# Patient Record
Sex: MALE
[De-identification: names, ages, dates, MRNs, and addresses within clinical notes are randomized per-mention and may not be internally consistent; named-entity substitution may affect disease eponyms.]

---

## 2022-10-03 PROBLEM — Z00.00 ENCOUNTER FOR PREVENTIVE HEALTH EXAMINATION: Status: ACTIVE | Noted: 2022-10-03

## 2022-10-05 ENCOUNTER — NON-APPOINTMENT (OUTPATIENT)
Age: 47
End: 2022-10-05

## 2022-10-05 DIAGNOSIS — Z83.3 FAMILY HISTORY OF DIABETES MELLITUS: ICD-10-CM

## 2022-10-05 DIAGNOSIS — Z83.438 FAMILY HISTORY OF OTHER DISORDER OF LIPOPROTEIN METABOLISM AND OTHER LIPIDEMIA: ICD-10-CM

## 2022-10-05 DIAGNOSIS — Z82.49 FAMILY HISTORY OF ISCHEMIC HEART DISEASE AND OTHER DISEASES OF THE CIRCULATORY SYSTEM: ICD-10-CM

## 2022-10-05 DIAGNOSIS — Z78.9 OTHER SPECIFIED HEALTH STATUS: ICD-10-CM

## 2022-10-20 ENCOUNTER — APPOINTMENT (OUTPATIENT)
Dept: ENDOCRINOLOGY | Facility: CLINIC | Age: 47
End: 2022-10-20

## 2022-10-20 VITALS
DIASTOLIC BLOOD PRESSURE: 80 MMHG | SYSTOLIC BLOOD PRESSURE: 130 MMHG | OXYGEN SATURATION: 98 % | HEIGHT: 68 IN | TEMPERATURE: 97.8 F | BODY MASS INDEX: 25.08 KG/M2 | HEART RATE: 98 BPM | WEIGHT: 165.5 LBS

## 2022-10-20 PROCEDURE — 99213 OFFICE O/P EST LOW 20 MIN: CPT

## 2022-10-20 NOTE — PHYSICAL EXAM
[Alert] : alert [No Acute Distress] : no acute distress [Well Nourished] : well nourished [Well Developed] : well developed [Normal Sclera/Conjunctiva] : normal sclera/conjunctiva [EOMI] : extra ocular movement intact [No Proptosis] : no proptosis [Normal Oropharynx] : the oropharynx was normal [Thyroid Not Enlarged] : the thyroid was not enlarged [No Thyroid Nodules] : no palpable thyroid nodules [No Respiratory Distress] : no respiratory distress [No Accessory Muscle Use] : no accessory muscle use [Clear to Auscultation] : lungs were clear to auscultation bilaterally [Normal S1, S2] : normal S1 and S2 [Normal Rate] : heart rate was normal [Regular Rhythm] : with a regular rhythm [No Edema] : no peripheral edema [Pedal Pulses Normal] : the pedal pulses are present [Normal Bowel Sounds] : normal bowel sounds [Not Tender] : non-tender [Not Distended] : not distended [Soft] : abdomen soft [Normal Anterior Cervical Nodes] : no anterior cervical lymphadenopathy [Normal Posterior Cervical Nodes] : no posterior cervical lymphadenopathy [No Spinal Tenderness] : no spinal tenderness [Spine Straight] : spine straight [No Stigmata of Cushings Syndrome] : no stigmata of Cushings Syndrome [Normal Gait] : normal gait [Normal Strength/Tone] : muscle strength and tone were normal [No Rash] : no rash [Normal Reflexes] : deep tendon reflexes were 2+ and symmetric [No Tremors] : no tremors [Oriented x3] : oriented to person, place, and time [Normal] : normal [Full ROM] : with full range of motion [Normal Appearance] : normal in appearance [Acanthosis Nigricans] : no acanthosis nigricans [Diminished Throughout Both Feet] : normal tactile sensation with monofilament testing throughout both feet [de-identified] : Furred [FreeTextEntry1] : Deferred

## 2022-10-20 NOTE — ADDENDUM
[FreeTextEntry1] : This note was written by Mari Castro, acting as the  for Dr. Wolff. This note accurately reflects the work and decisions made by Dr. Wolff.

## 2022-10-20 NOTE — ASSESSMENT
[Diabetes Foot Care] : diabetes foot care [Importance of Diet and Exercise] : importance of diet and exercise to improve glycemic control, achieve weight loss and improve cardiovascular health [FreeTextEntry1] : ASSESSMENT NOTES Young  male has a past medical history of type 2 diabetes hyperlipidemia and hypertension is currently stable on his medications he is quite well compliant with his diet and his sugar level has been under control physically he is active.  Has not noticed any symptoms of hypoglycemia.\par \par LABS SUMMARY patient's lab results are pending which were drawn recently\par - \par \par PLAN\par - follow up in 4 months after the blood test has been completed\par - repeat blood tests in 4 months\par - continue the current medications.\par Hypoglycemia and foot care was discussed with the patient.\par Patient is also scheduled to undergo annual eye examination.

## 2022-10-20 NOTE — HISTORY OF PRESENT ILLNESS
[FreeTextEntry1] : This is a 47-year-old  male who with a past medical history of type 2 diabetes hyperlipidemia and hypertension presents for routine follow-up.  According to the patient his sugar levels have been have been fairly stable.  He is compliant with his medications and his diet and exercise regimen.  His fingersticks at home in the morning are averaging around 90 mg per DL and after the main meal between 125 to 135 mg per DL.  He denies any symptoms of polyuria polydipsia or nocturia patient has been stable and he has no numbness of his extremities.  Patient's review of systems is otherwise negative.

## 2022-10-20 NOTE — REVIEW OF SYSTEMS
[Negative] : Heme/Lymph [Decreased Appetite] : appetite not decreased [Chest Pain] : no chest pain [Palpitations] : no palpitations [Shortness Of Breath] : no shortness of breath [SOB on Exertion] : no shortness of breath on exertion [Nausea] : no nausea [Abdominal Pain] : no abdominal pain [Vomiting] : no vomiting [Confusion] : no confusion [Difficulty Walking] : no difficulty walking [Pain/Numbness of Digits] : no pain/numbness of digits [Poor Balance] : steady state balance

## 2023-03-21 ENCOUNTER — APPOINTMENT (OUTPATIENT)
Dept: ENDOCRINOLOGY | Facility: CLINIC | Age: 48
End: 2023-03-21
Payer: SELF-PAY

## 2023-03-21 VITALS
OXYGEN SATURATION: 97 % | WEIGHT: 164 LBS | SYSTOLIC BLOOD PRESSURE: 130 MMHG | HEART RATE: 92 BPM | BODY MASS INDEX: 24.86 KG/M2 | DIASTOLIC BLOOD PRESSURE: 70 MMHG | HEIGHT: 68 IN

## 2023-03-21 PROCEDURE — 99213 OFFICE O/P EST LOW 20 MIN: CPT

## 2023-03-21 NOTE — ASSESSMENT
[Diabetes Foot Care] : diabetes foot care [Long Term Vascular Complications] : long term vascular complications of diabetes [Carbohydrate Consistent Diet] : carbohydrate consistent diet [Importance of Diet and Exercise] : importance of diet and exercise to improve glycemic control, achieve weight loss and improve cardiovascular health [Hypoglycemia Management] : hypoglycemia management [Self Monitoring of Blood Glucose] : self monitoring of blood glucose [Retinopathy Screening] : Patient was referred to ophthalmology for retinopathy screening [FreeTextEntry1] : Young  male who has a history of type 2 diabetes hyperlipidemia hypertension appears to be well compliant with his diet and lifestyle changes.  His sugar level however is slightly elevated especially after meals.  He denies any use of alcohol.  His last blood test performed February 11, 2023 showed a hemoglobin A1c down to 7.7% and his complete metabolic panel is normal.  He has no symptoms of any hypoglycemia and there is no evidence of any vascular complications.  Recommendation\par 1.  I have advised the patient to continue his current therapy. \par 2.  The importance of diet exercise and lifestyle modifications were discussed with the patient\par 3.  Patient will have a repeat blood analysis in 3 months after which she will return for follow-up evaluation.  If the hemoglobin A1c is still above 7% then we may consider starting him on a GLP-1 analog.  The plan discussed in detail with the patient thank you

## 2023-03-21 NOTE — HISTORY OF PRESENT ILLNESS
[FreeTextEntry1] : This is a 47-year-old  male with past medical history of type 2 diabetes hyperlipidemia and hypertension who now presents for routine follow-up and evaluation.  According to the patient his sugar levels have been pretty stable.  In the morning they are ranging between 100 to 120 mg per DL and after meals they do not exceed 180 mg per DL.  He claimed that he is compliant with his diet and exercise and takes his medicines as instructed.  He denies any symptoms of nocturia polyuria polydipsia visual changes or numbness of extremities.  He has not experienced any chest pains shortness of breath dysuria nausea vomiting.  Patient has no history of any surgeries.  His current medications include pioglitazone 30 mg daily, bisoprolol/hydrochlorothiazide 5/6.25 mg tablet daily, Farxiga 10 mg daily, glimepiride 4 mg tablets 1 tablet twice a day, lisinopril 20 mg daily, metformin 850 mg tablets 3 times a day, simvastatin 10 mg daily and Norvasc 5 mg daily.

## 2023-11-13 ENCOUNTER — RX RENEWAL (OUTPATIENT)
Age: 48
End: 2023-11-13

## 2023-12-08 LAB — HBA1C MFR BLD HPLC: 7.7

## 2024-01-29 ENCOUNTER — APPOINTMENT (OUTPATIENT)
Dept: ENDOCRINOLOGY | Facility: CLINIC | Age: 49
End: 2024-01-29
Payer: SELF-PAY

## 2024-01-29 VITALS
WEIGHT: 165 LBS | OXYGEN SATURATION: 99 % | SYSTOLIC BLOOD PRESSURE: 136 MMHG | HEART RATE: 94 BPM | BODY MASS INDEX: 25.01 KG/M2 | HEIGHT: 68 IN | DIASTOLIC BLOOD PRESSURE: 86 MMHG | TEMPERATURE: 96.8 F

## 2024-01-29 PROCEDURE — 99213 OFFICE O/P EST LOW 20 MIN: CPT

## 2024-01-29 NOTE — HISTORY OF PRESENT ILLNESS
[FreeTextEntry1] : I this is a pleasant 48-year-old  male who has a past medical history of type 2 diabetes, hyperlipidemia and hypertension presents for routine follow-up.  Patient is currently taking multiple medications but it appears that he has not been compliant with his diet and exercise regimen.  His fingersticks in the morning are between 90 to 110 mg per DL and after the meals 130 to 150 mg per DL.  He has mild symptoms of polyuria and polydipsia.  He denies any numbness of extremities or visual changes or any dysuria.  Has not noticed any chest pain shortness of breath nausea vomiting.  Physically he is not very active and does not exercise at the present time.  He is current diabetic medications include Actos 30 mg daily, amlodipine 5 mg daily, because of prolonged with hydrochlorothiazide 5/6.25 mg daily, Farxiga 10 mg daily, glimepiride 4 mg twice a day, lisinopril 20 mg daily metformin 850 mg 3 times a day and simvastatin 10 mg daily.  Review of systems is otherwise negative.

## 2024-01-29 NOTE — PHYSICAL EXAM
[Alert] : alert [Well Nourished] : well nourished [No Acute Distress] : no acute distress [Well Developed] : well developed [Normal Sclera/Conjunctiva] : normal sclera/conjunctiva [EOMI] : extra ocular movement intact [No Proptosis] : no proptosis [Normal Outer Ear/Nose] : the ears and nose were normal in appearance [Normal Hearing] : hearing was normal [Normal Oropharynx] : the oropharynx was normal [No Neck Mass] : no neck mass was observed [Thyroid Not Enlarged] : the thyroid was not enlarged [No Thyroid Nodules] : no palpable thyroid nodules [No Respiratory Distress] : no respiratory distress [No Accessory Muscle Use] : no accessory muscle use [Clear to Auscultation] : lungs were clear to auscultation bilaterally [Normal S1, S2] : normal S1 and S2 [Normal Rate] : heart rate was normal [Regular Rhythm] : with a regular rhythm [Carotids Normal] : carotid pulses were normal with no bruits [No Edema] : no peripheral edema [Pedal Pulses Normal] : the pedal pulses are present [Normal Bowel Sounds] : normal bowel sounds [Not Tender] : non-tender [Not Distended] : not distended [Soft] : abdomen soft [No HSM] : no hepato-splenomegaly [Normal Supraclavicular Nodes] : no supraclavicular lymphadenopathy [Normal Anterior Cervical Nodes] : no anterior cervical lymphadenopathy [Normal Posterior Cervical Nodes] : no posterior cervical lymphadenopathy [No CVA Tenderness] : no ~M costovertebral angle tenderness [No Spinal Tenderness] : no spinal tenderness [Spine Straight] : spine straight [No Stigmata of Cushings Syndrome] : no stigmata of Cushings Syndrome [Normal Gait] : normal gait [No Clubbing, Cyanosis] : no clubbing  or cyanosis of the fingernails [Normal Strength/Tone] : muscle strength and tone were normal [No Rash] : no rash [No Skin Lesions] : no skin lesions [No Motor Deficits] : the motor exam was normal [No Sensory Deficits] : the sensory exam was normal to light touch and pinprick [Normal Reflexes] : deep tendon reflexes were 2+ and symmetric [No Tremors] : no tremors [Oriented x3] : oriented to person, place, and time

## 2024-01-29 NOTE — ASSESSMENT
[Diabetes Foot Care] : diabetes foot care [Long Term Vascular Complications] : long term vascular complications of diabetes [Carbohydrate Consistent Diet] : carbohydrate consistent diet [Importance of Diet and Exercise] : importance of diet and exercise to improve glycemic control, achieve weight loss and improve cardiovascular health [Exercise/Effect on Glucose] : exercise/effect on glucose [Hypoglycemia Management] : hypoglycemia management [Self Monitoring of Blood Glucose] : self monitoring of blood glucose [Retinopathy Screening] : Patient was referred to ophthalmology for retinopathy screening [FreeTextEntry1] : Young  male who has a history of a type 2 diabetes currently on oral medications.  Patient recently had a blood test on January 21 which revealed that his hemoglobin A1c is elevated at 8.5%, TSH is normal 1.660 complete metabolic panel and urine result is within normal range.  Suspect that the patient diabetes is uncontrolled due to his poor weight and also lack of exercise otherwise he is compliant with his medications.  There is no evidence of any vascular complications at the present time.  Recommendation 1.  I had a lengthy discussion with the patient explained to him in detail regarding the management of his diabetes.  Patient remains at a high risk for vascular complications unless he controls his diet and exercise regimen.  Patient was offered treatment with insulin but he is currently against it and would like to hold off for another 3 more months since he is going to change his lifestyle. 2.  I have advised the patient to continue his current oral diabetic medications. 3.  Patient is aware of the  signs and symptoms of hypoglycemia and its management. 4.  Patient will repeat the blood test in 3 months time and after this he will follow-up for reevaluation.  Thank you

## 2024-04-01 ENCOUNTER — RX RENEWAL (OUTPATIENT)
Age: 49
End: 2024-04-01

## 2024-04-22 ENCOUNTER — RX RENEWAL (OUTPATIENT)
Age: 49
End: 2024-04-22

## 2024-05-01 ENCOUNTER — APPOINTMENT (OUTPATIENT)
Dept: ENDOCRINOLOGY | Facility: CLINIC | Age: 49
End: 2024-05-01

## 2024-05-13 ENCOUNTER — APPOINTMENT (OUTPATIENT)
Dept: ENDOCRINOLOGY | Facility: CLINIC | Age: 49
End: 2024-05-13
Payer: SELF-PAY

## 2024-05-13 VITALS
BODY MASS INDEX: 25.01 KG/M2 | HEIGHT: 68 IN | OXYGEN SATURATION: 97 % | HEART RATE: 73 BPM | SYSTOLIC BLOOD PRESSURE: 132 MMHG | DIASTOLIC BLOOD PRESSURE: 86 MMHG | WEIGHT: 165 LBS

## 2024-05-13 DIAGNOSIS — E78.5 HYPERLIPIDEMIA, UNSPECIFIED: ICD-10-CM

## 2024-05-13 DIAGNOSIS — E11.9 TYPE 2 DIABETES MELLITUS W/OUT COMPLICATIONS: ICD-10-CM

## 2024-05-13 DIAGNOSIS — I10 ESSENTIAL (PRIMARY) HYPERTENSION: ICD-10-CM

## 2024-05-13 PROCEDURE — 99213 OFFICE O/P EST LOW 20 MIN: CPT

## 2024-05-13 RX ORDER — METFORMIN HYDROCHLORIDE 850 MG/1
850 TABLET, COATED ORAL 3 TIMES DAILY
Qty: 270 | Refills: 0 | Status: ACTIVE | COMMUNITY
Start: 2023-03-21

## 2024-05-13 RX ORDER — GLIMEPIRIDE 4 MG/1
4 TABLET ORAL TWICE DAILY
Qty: 180 | Refills: 1 | Status: ACTIVE | COMMUNITY

## 2024-05-13 RX ORDER — PIOGLITAZONE 30 MG/1
30 TABLET ORAL DAILY
Refills: 0 | Status: ACTIVE | COMMUNITY

## 2024-05-13 RX ORDER — BISOPROLOL FUMARATE AND HYDROCHLOROTHIAZIDE 5; 6.25 MG/1; MG/1
5-6.25 TABLET, FILM COATED ORAL DAILY
Qty: 90 | Refills: 0 | Status: COMPLETED | COMMUNITY
Start: 2023-03-21 | End: 2024-05-13

## 2024-05-13 RX ORDER — METFORMIN HYDROCHLORIDE 850 MG/1
850 TABLET, COATED ORAL 3 TIMES DAILY
Refills: 0 | Status: COMPLETED | COMMUNITY
End: 2024-05-13

## 2024-05-13 RX ORDER — DAPAGLIFLOZIN 10 MG/1
10 TABLET, FILM COATED ORAL DAILY
Refills: 0 | Status: ACTIVE | COMMUNITY

## 2024-05-13 RX ORDER — BISOPROLOL FUMARATE AND HYDROCHLOROTHIAZIDE 5; 6.25 MG/1; MG/1
5-6.25 TABLET, FILM COATED ORAL DAILY
Qty: 90 | Refills: 0 | Status: ACTIVE | COMMUNITY

## 2024-05-13 RX ORDER — PIOGLITAZONE HYDROCHLORIDE 30 MG/1
30 TABLET ORAL DAILY
Qty: 90 | Refills: 2 | Status: COMPLETED | COMMUNITY
Start: 2023-03-21 | End: 2024-05-13

## 2024-05-13 RX ORDER — LISINOPRIL 20 MG/1
20 TABLET ORAL DAILY
Qty: 90 | Refills: 2 | Status: ACTIVE | COMMUNITY

## 2024-05-13 RX ORDER — AMLODIPINE BESYLATE 5 MG/1
5 TABLET ORAL DAILY
Qty: 90 | Refills: 2 | Status: COMPLETED | COMMUNITY
Start: 2023-03-21 | End: 2024-05-13

## 2024-05-13 RX ORDER — AMLODIPINE BESYLATE 5 MG/1
5 TABLET ORAL
Qty: 90 | Refills: 2 | Status: ACTIVE | COMMUNITY

## 2024-05-13 NOTE — ASSESSMENT
[Diabetes Foot Care] : diabetes foot care [Long Term Vascular Complications] : long term vascular complications of diabetes [Carbohydrate Consistent Diet] : carbohydrate consistent diet [Importance of Diet and Exercise] : importance of diet and exercise to improve glycemic control, achieve weight loss and improve cardiovascular health [Exercise/Effect on Glucose] : exercise/effect on glucose [Hypoglycemia Management] : hypoglycemia management [Self Monitoring of Blood Glucose] : self monitoring of blood glucose [Retinopathy Screening] : Patient was referred to ophthalmology for retinopathy screening [FreeTextEntry1] : Young  male with a past medical history of type 2 diabetes, hypertension and hyperlipidemia presents for routine follow-up.  Patient recently had a blood test performed on 27 April which showed that the complete metabolic panel is normal, fasting glucose is 117 mg per DL, hemoglobin A1c is 8.0%,.  Patient still has uncontrolled type 2 diabetes even though he claimed that he is compliant with his diet and medications and physical exercise recommendation 1.  I have advised the patient to continue the current medications 2.  The importance of diet exercise was discussed with the patient 3.  Will obtain a repeat blood test in 3 months and if the hemoglobin A1c is still high we will have to initiate insulin therapy.  Plan was discussed in detail with the patient thank you

## 2024-05-13 NOTE — HISTORY OF PRESENT ILLNESS
[FreeTextEntry1] : This is a young 48-year-old  male who has a past medical history of type II, hypertension and hyperlipidemia.  Presents for routine follow-up.  Patient currently denies any significant symptoms.  He is Compliant with his medications and diet.  Patient is currently taking amlodipine 5 mg tablets, Lisinopril 20 mill gram tablet, Simvastatin 10 mg tablets daily, Bisoprolol plus hydrochlorothiazide 5.625 mg/25 mg daily, Farxiga 10 mg daily, glimepiride 4 mg tablet twice a day, metformin 850 mg 3 times daily and Actos 30 mg daily.  Patient denies any headache blurry vision, chest pain, shortness of breath, polyuria or polydipsia.  His vision has been stable and he denies any numbness or tingling of the lower extremities.  Patient claimed that he is walking at least 1 mile every day.

## 2024-06-20 ENCOUNTER — RX RENEWAL (OUTPATIENT)
Age: 49
End: 2024-06-20

## 2024-06-20 RX ORDER — SIMVASTATIN 10 MG/1
10 TABLET, FILM COATED ORAL
Qty: 90 | Refills: 0 | Status: ACTIVE | COMMUNITY
Start: 2024-06-20 | End: 1900-01-01

## 2024-07-23 ENCOUNTER — OFFICE (OUTPATIENT)
Dept: URBAN - METROPOLITAN AREA CLINIC 8 | Facility: CLINIC | Age: 49
Setting detail: OPHTHALMOLOGY
End: 2024-07-23

## 2024-07-23 DIAGNOSIS — H25.13: ICD-10-CM

## 2024-07-23 PROBLEM — E11.9 DIABETES TYPE 2 NO RETINOPATHY: Status: ACTIVE | Noted: 2024-07-23

## 2024-07-23 PROCEDURE — 99204 OFFICE O/P NEW MOD 45 MIN: CPT

## 2024-07-23 ASSESSMENT — CONFRONTATIONAL VISUAL FIELD TEST (CVF)
OS_FINDINGS: FULL
OD_FINDINGS: FULL

## 2024-08-30 ENCOUNTER — RX RENEWAL (OUTPATIENT)
Age: 49
End: 2024-08-30

## 2024-11-13 ENCOUNTER — RX RENEWAL (OUTPATIENT)
Age: 49
End: 2024-11-13

## 2024-12-03 ENCOUNTER — APPOINTMENT (OUTPATIENT)
Dept: ENDOCRINOLOGY | Facility: CLINIC | Age: 49
End: 2024-12-03
Payer: SELF-PAY

## 2024-12-03 VITALS
HEART RATE: 96 BPM | HEIGHT: 68 IN | WEIGHT: 163 LBS | OXYGEN SATURATION: 98 % | DIASTOLIC BLOOD PRESSURE: 74 MMHG | SYSTOLIC BLOOD PRESSURE: 132 MMHG | BODY MASS INDEX: 24.71 KG/M2

## 2024-12-03 DIAGNOSIS — E11.9 TYPE 2 DIABETES MELLITUS W/OUT COMPLICATIONS: ICD-10-CM

## 2024-12-03 DIAGNOSIS — I10 ESSENTIAL (PRIMARY) HYPERTENSION: ICD-10-CM

## 2024-12-03 DIAGNOSIS — E78.5 HYPERLIPIDEMIA, UNSPECIFIED: ICD-10-CM

## 2024-12-03 LAB — HBA1C MFR BLD HPLC: 8.1

## 2024-12-03 PROCEDURE — 99213 OFFICE O/P EST LOW 20 MIN: CPT

## 2024-12-03 RX ORDER — ACARBOSE 25 MG/1
25 TABLET ORAL 3 TIMES DAILY
Qty: 90 | Refills: 5 | Status: ACTIVE | COMMUNITY
Start: 2024-12-03 | End: 1900-01-01

## 2024-12-03 NOTE — HISTORY OF PRESENT ILLNESS
[FreeTextEntry1] : This is a pleasant 49-year-old  male with a past medical history of type 2 diabetes, hypertension and hyperlipidemia presents for routine follow-up.  Patient denies any significant complaints.  He reports that his sugar levels in the morning are between 90 to 95 mg per DL and after he eats his main meal they will shoot up to 1 4145 mg per DL.  Patient is compliant with his diet and also he is taking his diabetic medications which include metformin 850 mg 3 times a day, pioglitazone 30 mg daily, Farxiga 10 mg daily, and glimepiride 4 mg tablets twice a day.  He denies any symptoms of polyuria polydipsia or nocturia.  He has not had any episodes of dysuria, nausea vomiting abdominal pain or vision changes.  Physically the patient is very active and is working very hard.  He has no numbness or tingling of his lower extremities or any hypoglycemia.  His other medications include amlodipine 5 mg daily, bisoprolol with hydrochlorothiazide 5/6.25 mg daily, lisinopril 20 mg daily, simvastatin 10 mg daily .  Review of systems otherwise negative.

## 2024-12-03 NOTE — ASSESSMENT
[Diabetes Foot Care] : diabetes foot care [Long Term Vascular Complications] : long term vascular complications of diabetes [Carbohydrate Consistent Diet] : carbohydrate consistent diet [Importance of Diet and Exercise] : importance of diet and exercise to improve glycemic control, achieve weight loss and improve cardiovascular health [Exercise/Effect on Glucose] : exercise/effect on glucose [Hypoglycemia Management] : hypoglycemia management [Self Monitoring of Blood Glucose] : self monitoring of blood glucose [Retinopathy Screening] : Patient was referred to ophthalmology for retinopathy screening [FreeTextEntry1] : Young  male who has a past medical history of type 2 diabetes, hypertension hyperlipidemia presents for routine follow-up.  Patient's most recent lab test showed that his hemoglobin A1c is 8.1%, total cholesterol of 135, LDL is 71 mg per DL and his complete metabolic panel is within normal limits.  Patient claimed that he is compliant with his diet but I suspect that most likely he is having spikes after the meals.  Recommendation 1.  Patient was offered treatment with insulin therapy but the patient is very resistant to this and would like to wait for another 3 to 4 months before he will take any insulin.  Patient claimed that he will try his best to lower his blood glucose levels after he eats and will be more compliant with his diet and exercise. 2.  I advised the patient to start therapy with acarbose 25 mg tablets 3 times a day with meals.  Benefits and side effects of the medications were explained to the patient. 3.  Patient will continue with all his current medications 4. The patient will follow-up in approximately 3 to 4 months after obtaining a repeat blood test.  The plan was discussed in detail with the patient thank you

## 2024-12-03 NOTE — PHYSICAL EXAM
[Alert] : alert [Well Nourished] : well nourished [No Acute Distress] : no acute distress [Well Developed] : well developed [Normal Sclera/Conjunctiva] : normal sclera/conjunctiva [EOMI] : extra ocular movement intact [No Proptosis] : no proptosis [Normal Oropharynx] : the oropharynx was normal [Thyroid Not Enlarged] : the thyroid was not enlarged [No Thyroid Nodules] : no palpable thyroid nodules [No Respiratory Distress] : no respiratory distress [No Accessory Muscle Use] : no accessory muscle use [Clear to Auscultation] : lungs were clear to auscultation bilaterally [Normal S1, S2] : normal S1 and S2 [Normal Rate] : heart rate was normal [Regular Rhythm] : with a regular rhythm [No Edema] : no peripheral edema [Pedal Pulses Normal] : the pedal pulses are present [Normal Bowel Sounds] : normal bowel sounds [Not Tender] : non-tender [Not Distended] : not distended [Soft] : abdomen soft [Normal Supraclavicular Nodes] : no supraclavicular lymphadenopathy [Normal Anterior Cervical Nodes] : no anterior cervical lymphadenopathy [Normal Posterior Cervical Nodes] : no posterior cervical lymphadenopathy [No Spinal Tenderness] : no spinal tenderness [Spine Straight] : spine straight [No Stigmata of Cushings Syndrome] : no stigmata of Cushings Syndrome [Normal Gait] : normal gait [No Clubbing, Cyanosis] : no clubbing  or cyanosis of the fingernails [Normal Strength/Tone] : muscle strength and tone were normal [No Rash] : no rash [Acanthosis Nigricans] : no acanthosis nigricans [No Motor Deficits] : the motor exam was normal [No Sensory Deficits] : the sensory exam was normal to light touch and pinprick [Normal Reflexes] : deep tendon reflexes were 2+ and symmetric [No Tremors] : no tremors [Oriented x3] : oriented to person, place, and time [de-identified] : Patient BMI is at 24.78.

## 2024-12-16 PROBLEM — R73.09 ELEVATED HEMOGLOBIN A1C: Status: ACTIVE | Noted: 2024-12-16

## 2025-02-06 ENCOUNTER — RX RENEWAL (OUTPATIENT)
Age: 50
End: 2025-02-06

## 2025-04-14 ENCOUNTER — RX RENEWAL (OUTPATIENT)
Age: 50
End: 2025-04-14

## 2025-06-26 ENCOUNTER — RX RENEWAL (OUTPATIENT)
Age: 50
End: 2025-06-26

## 2025-07-25 ENCOUNTER — RX RENEWAL (OUTPATIENT)
Age: 50
End: 2025-07-25